# Patient Record
Sex: MALE | URBAN - METROPOLITAN AREA
[De-identification: names, ages, dates, MRNs, and addresses within clinical notes are randomized per-mention and may not be internally consistent; named-entity substitution may affect disease eponyms.]

---
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
## 2022-10-28 ENCOUNTER — HOSPITAL ENCOUNTER (EMERGENCY)
Facility: OTHER | Age: 22
Discharge: HOME | End: 2022-10-28
Attending: EMERGENCY MEDICINE
Payer: COMMERCIAL

## 2022-10-28 VITALS
HEART RATE: 89 BPM | DIASTOLIC BLOOD PRESSURE: 63 MMHG | TEMPERATURE: 98 F | SYSTOLIC BLOOD PRESSURE: 121 MMHG | RESPIRATION RATE: 16 BRPM | OXYGEN SATURATION: 100 %

## 2022-10-28 DIAGNOSIS — F10.920 ALCOHOLIC INTOXICATION WITHOUT COMPLICATION: Primary | ICD-10-CM

## 2022-10-28 LAB — POCT GLUCOSE: 105 MG/DL (ref 70–110)

## 2022-10-28 PROCEDURE — 96361 HYDRATE IV INFUSION ADD-ON: CPT

## 2022-10-28 PROCEDURE — 82962 GLUCOSE BLOOD TEST: CPT

## 2022-10-28 PROCEDURE — 25000003 PHARM REV CODE 250: Performed by: EMERGENCY MEDICINE

## 2022-10-28 PROCEDURE — 63600175 PHARM REV CODE 636 W HCPCS: Performed by: EMERGENCY MEDICINE

## 2022-10-28 PROCEDURE — 96374 THER/PROPH/DIAG INJ IV PUSH: CPT

## 2022-10-28 PROCEDURE — 99284 EMERGENCY DEPT VISIT MOD MDM: CPT | Mod: 25

## 2022-10-28 RX ORDER — ONDANSETRON 2 MG/ML
4 INJECTION INTRAMUSCULAR; INTRAVENOUS
Status: COMPLETED | OUTPATIENT
Start: 2022-10-28 | End: 2022-10-28

## 2022-10-28 RX ADMIN — SODIUM CHLORIDE 1000 ML: 0.9 INJECTION, SOLUTION INTRAVENOUS at 03:10

## 2022-10-28 RX ADMIN — SODIUM CHLORIDE 1000 ML: 0.9 INJECTION, SOLUTION INTRAVENOUS at 12:10

## 2022-10-28 RX ADMIN — ONDANSETRON 4 MG: 2 INJECTION INTRAMUSCULAR; INTRAVENOUS at 12:10

## 2022-10-28 NOTE — ED PROVIDER NOTES
Encounter Date: 10/28/2022    SCRIBE #1 NOTE: I, Jina Motley, am scribing for, and in the presence of,  Patt Bonner MD. I have scribed the following portions of the note - Other sections scribed: HPI, ROS, PE.     History     Chief Complaint   Patient presents with    Alcohol Intoxication     Pt found [place] [facility] laying down vomiting.  Pt ambulated to stretcher responds to voice     Mike Alberts is a 21 y.o. male, with no significant PMHx, who presents to the ED via EMS for evaluation of ETOH intoxication. Per EMS, pt was found at the [employer] [facility] laying down with nausea and vomiting. Pt admits to ETOH consumption and otherwise denies illicit drug use. He has NKDA. Information in HPI limited secondary to pt's intoxication.     The history is provided by the EMS personnel and the patient.   Review of patient's allergies indicates:   Allergen Reactions    Avocado (laurus persea)     Banana      History reviewed. No pertinent past medical history.  History reviewed. No pertinent surgical history.  History reviewed. No pertinent family history.  Social History     Tobacco Use    Smoking status: Unknown   Substance Use Topics    Alcohol use: Yes    Drug use: Never     Review of Systems   Constitutional:  Negative for chills and fever.   HENT:  Negative for congestion, drooling, ear pain and sore throat.    Eyes:  Negative for pain, redness and visual disturbance.   Respiratory:  Negative for shortness of breath.    Cardiovascular:  Negative for chest pain.   Gastrointestinal:  Positive for nausea and vomiting. Negative for abdominal pain.   Endocrine: Negative for cold intolerance, polydipsia, polyphagia and polyuria.   Genitourinary:  Negative for dysuria, flank pain and hematuria.   Musculoskeletal:  Negative for back pain.   Skin:  Negative for rash.   Allergic/Immunologic: Negative for environmental allergies and food allergies.   Neurological:  Negative for dizziness, syncope, weakness, light-headedness and  headaches.   Hematological:  Does not bruise/bleed easily.   Psychiatric/Behavioral:  Negative for agitation, behavioral problems and confusion.    All other systems reviewed and are negative.    Physical Exam     Initial Vitals [10/28/22 0009]   BP Pulse Resp Temp SpO2   122/77 84 16 -- 98 %      MAP       --         Physical Exam    Nursing note and vitals reviewed.  Constitutional: He appears well-developed and well-nourished. No distress.   Clinical signs of intoxication. Vomit on shirt. Awake and answering questions.   HENT:   Head: Normocephalic and atraumatic.   Eyes: EOM are normal. Pupils are equal, round, and reactive to light.   Neck: Neck supple.   Normal range of motion.  Cardiovascular:  Normal rate, regular rhythm, normal heart sounds and intact distal pulses.     Exam reveals no gallop and no friction rub.       No murmur heard.  Pulmonary/Chest: Breath sounds normal. No stridor. No respiratory distress. He has no wheezes. He has no rhonchi. He has no rales. He exhibits no tenderness.   Abdominal: Abdomen is soft. Bowel sounds are normal. He exhibits no distension and no mass. There is no abdominal tenderness. There is no rebound and no guarding.   Musculoskeletal:         General: No tenderness. Normal range of motion.      Cervical back: Normal range of motion and neck supple.     Neurological: He is alert and oriented to person, place, and time.   Follows commands.   Skin: Skin is warm and dry.   Psychiatric: He has a normal mood and affect. His behavior is normal. Judgment and thought content normal.       ED Course   Procedures  Labs Reviewed   POCT GLUCOSE          Imaging Results    None          Medications   sodium chloride 0.9% bolus 1,000 mL (1,000 mLs Intravenous New Bag 10/28/22 0311)   ondansetron injection 4 mg (4 mg Intravenous Given 10/28/22 0037)   sodium chloride 0.9% bolus 1,000 mL (0 mLs Intravenous Stopped 10/28/22 0139)     Medical Decision Making:   History:   Old Medical  Records: I decided to obtain old medical records.  Initial Assessment:   Mike Alberts is a 21 y.o. male, with no significant PMHx, who presents to the ED via EMS for evaluation of ETOH intoxication.   Differential Diagnosis:   Includes not limited to EtOH intoxication versus metabolic derangement versus intracranial pathology (inspected).  Clinical Tests:   Lab Tests: Ordered and Reviewed  ED Management:  Patient admits to drinking alcohol.  Patient region clinical sobriety appropriately.  Will continue to monitor and give fluid hydration.    1:41 AM 10/28/2022  Demond Bonner MD      Update note:  With reassessment, patient is awake, alert and oriented.  Patient ambulatory without difficulty.  Patient is clinically sober.  Will discharge at this time with outpatient follow-up and return precautions.  Patient  agrees with plan of care.    3:20 AM 10/28/2022  Demond Bonner MD              DISCLAIMER: This note was prepared with [employer] voice recognition transcription software. Garbled syntax, mangled pronouns, and other bizarre constructions may be attributed to that software system             Scribe Attestation:   Scribe #1: I performed the above scribed service and the documentation accurately describes the services I performed. I attest to the accuracy of the note.    I, Demond Bonner MD, personally performed the services described in this documentation. All medical record entries made by the scribe were at my direction and in my presence. I have reviewed the chart and agree that the record reflects my personal performance and is accurate and complete.                   Clinical Impression:   Final diagnoses:  [F10.920] Alcoholic intoxication without complication (Primary)      ED Disposition Condition    Discharge Stable          ED Prescriptions    None       Follow-up Information       Follow up With Specialties Details Why Contact Info    [facility] Emergency Medicine  As needed, If  symptoms worsen [address]  [facility] [id number] [phone]    [facility]  Schedule an appointment as soon as possible for a visit in 1 day  [address] [facility] [id number]             Patt Bonner MD  10/28/22 2962